# Patient Record
Sex: FEMALE | Race: ASIAN | Employment: OTHER | URBAN - METROPOLITAN AREA
[De-identification: names, ages, dates, MRNs, and addresses within clinical notes are randomized per-mention and may not be internally consistent; named-entity substitution may affect disease eponyms.]

---

## 2019-06-12 ENCOUNTER — APPOINTMENT (OUTPATIENT)
Dept: GENERAL RADIOLOGY | Age: 69
End: 2019-06-12
Attending: PHYSICIAN ASSISTANT

## 2019-06-12 ENCOUNTER — APPOINTMENT (OUTPATIENT)
Dept: CT IMAGING | Age: 69
End: 2019-06-12
Attending: EMERGENCY MEDICINE

## 2019-06-12 ENCOUNTER — HOSPITAL ENCOUNTER (EMERGENCY)
Age: 69
Discharge: HOME | End: 2019-06-12
Attending: EMERGENCY MEDICINE

## 2019-06-12 VITALS
OXYGEN SATURATION: 95 % | WEIGHT: 140 LBS | RESPIRATION RATE: 20 BRPM | TEMPERATURE: 99 F | SYSTOLIC BLOOD PRESSURE: 131 MMHG | HEART RATE: 112 BPM | DIASTOLIC BLOOD PRESSURE: 60 MMHG

## 2019-06-12 DIAGNOSIS — J98.01 BRONCHOSPASM: ICD-10-CM

## 2019-06-12 DIAGNOSIS — J20.9 ACUTE BRONCHITIS, UNSPECIFIED ORGANISM: Primary | ICD-10-CM

## 2019-06-12 PROCEDURE — 83605 ASSAY OF LACTIC ACID: CPT | Performed by: PHYSICIAN ASSISTANT

## 2019-06-12 PROCEDURE — 93005 ELECTROCARDIOGRAM TRACING: CPT

## 2019-06-12 PROCEDURE — 94644 CONT INHLJ TX 1ST HOUR: CPT

## 2019-06-12 PROCEDURE — 93010 ELECTROCARDIOGRAM REPORT: CPT

## 2019-06-12 PROCEDURE — 71045 X-RAY EXAM CHEST 1 VIEW: CPT | Performed by: PHYSICIAN ASSISTANT

## 2019-06-12 PROCEDURE — 85378 FIBRIN DEGRADE SEMIQUANT: CPT | Performed by: PHYSICIAN ASSISTANT

## 2019-06-12 PROCEDURE — 99285 EMERGENCY DEPT VISIT HI MDM: CPT

## 2019-06-12 PROCEDURE — 83880 ASSAY OF NATRIURETIC PEPTIDE: CPT | Performed by: PHYSICIAN ASSISTANT

## 2019-06-12 PROCEDURE — 71275 CT ANGIOGRAPHY CHEST: CPT | Performed by: EMERGENCY MEDICINE

## 2019-06-12 PROCEDURE — 84484 ASSAY OF TROPONIN QUANT: CPT | Performed by: PHYSICIAN ASSISTANT

## 2019-06-12 PROCEDURE — 80053 COMPREHEN METABOLIC PANEL: CPT | Performed by: PHYSICIAN ASSISTANT

## 2019-06-12 PROCEDURE — 85025 COMPLETE CBC W/AUTO DIFF WBC: CPT | Performed by: PHYSICIAN ASSISTANT

## 2019-06-12 PROCEDURE — 94640 AIRWAY INHALATION TREATMENT: CPT

## 2019-06-12 PROCEDURE — 96374 THER/PROPH/DIAG INJ IV PUSH: CPT

## 2019-06-12 RX ORDER — IPRATROPIUM BROMIDE AND ALBUTEROL SULFATE 2.5; .5 MG/3ML; MG/3ML
3 SOLUTION RESPIRATORY (INHALATION) ONCE
Status: COMPLETED | OUTPATIENT
Start: 2019-06-12 | End: 2019-06-12

## 2019-06-12 RX ORDER — METHYLPREDNISOLONE SODIUM SUCCINATE 125 MG/2ML
125 INJECTION, POWDER, LYOPHILIZED, FOR SOLUTION INTRAMUSCULAR; INTRAVENOUS ONCE
Status: COMPLETED | OUTPATIENT
Start: 2019-06-12 | End: 2019-06-12

## 2019-06-12 RX ORDER — PREDNISONE 20 MG/1
40 TABLET ORAL DAILY
Qty: 10 TABLET | Refills: 0 | Status: SHIPPED | OUTPATIENT
Start: 2019-06-12 | End: 2019-06-17

## 2019-06-12 RX ORDER — AZITHROMYCIN 250 MG/1
TABLET, FILM COATED ORAL
Qty: 1 PACKAGE | Refills: 0 | Status: SHIPPED | OUTPATIENT
Start: 2019-06-12 | End: 2019-06-17

## 2019-06-12 RX ORDER — ALBUTEROL SULFATE 90 UG/1
2 AEROSOL, METERED RESPIRATORY (INHALATION) EVERY 4 HOURS PRN
Qty: 1 INHALER | Refills: 0 | Status: SHIPPED | OUTPATIENT
Start: 2019-06-12 | End: 2019-07-12

## 2019-06-12 NOTE — ED NOTES
I reviewed that chart and discussed the case. I have examined the patient and noted complains of shortness of breath and coughing since yesterday. The patient started with a cough and productive sputum. According to the family.   The patient has felt war Normal 3 vessel arch. Atheromatous calcifications of the aorta. No evidence of aneurysm or dissection. LUNGS:  Scarring within the left apex. Dependent atelectasis bilaterally. No focal consolidation. No pneumothorax.   2 mm right upper lobe nodule on reexamined her after her duo nebs. Albuterol. She is gotten steroids.   She had no complaints her lungs sound significant better there is no wheezing on repeat exam.  The patient was able to ambulate in the department she was not hypoxic at this present t

## 2019-06-12 NOTE — ED PROVIDER NOTES
Patient Seen in: THE [facility] Emergency Department In [place]    History   Patient presents with:  Dyspnea JOSÉ SOB (respiratory)  Laceration Abrasion (integumentary)    Stated Complaint: SOB with cold s/s that began yesterday    RENAE    Zainab Andrew is a 70-year to person, place, and time. Patient appears well-developed and well-nourished, non-toxic and in obvious respiratory distress upon arrival.  Head: Normocephalic and atraumatic.    Eyes: PERRLA, EOMI, no periorbital edema, anicteric, normal conjuctiva  ENT: N Absolute 8.11 (*)     All other components within normal limits   LACTIC ACID, PLASMA - Normal   TROPONIN I - Normal   CBC WITH DIFFERENTIAL WITH PLATELET    Narrative:      The following orders were created for panel order CBC WITH DIFFERENTIAL WITH PLATEL number 39. 5 mm left upper lobe nodule. MEDIASTINUM/CHARISMA:  Mildly prominent right hilar lymph node measuring 1.3 x 0.8 cm. There is a precarinal lymph node measuring 1.3 x 0.8 cm. PLEURA:  Normal. CARDIAC:  No pericardial effusion.  CHEST WALL:  Normal. LI pulses. -D-dimer is elevated slightly, so CTA is ordered. Chest x-ray shows no acute findings. CTA shows no pulmonary embolism, but does reveal a small nodule that will require follow-up CT scan as an outpatient. -Mild leukocytosis with left shift.   pr (90 Base) MCG/ACT Inhalation Aero Soln  Inhale 2 puffs into the lungs every 4 (four) hours as needed for Wheezing., Normal, Disp-1 Inhaler, R-0

## 2019-06-12 NOTE — ED INITIAL ASSESSMENT (HPI)
Family states pt became SOB and coughing since yesterday. Audible wheezing on arrival. Denies chest pain.

## (undated) NOTE — ED AVS SNAPSHOT
Federico Yeung   MRN: [id number]    Department:  [facility] Emergency Department in [place]   Date of Visit:  6/12/2019           Disclosure     Insurance plans vary and the physician(s) referred by the ER may not be covered by your plan.  Debora tell this physician (or your personal doctor if your instructions are to return to your personal doctor) about any new or lasting problems. The primary care or specialist physician will see patients referred from the [facility] Emergency Department.  Twin Liz